# Patient Record
Sex: FEMALE | Race: WHITE
[De-identification: names, ages, dates, MRNs, and addresses within clinical notes are randomized per-mention and may not be internally consistent; named-entity substitution may affect disease eponyms.]

---

## 2020-10-03 ENCOUNTER — HOSPITAL ENCOUNTER (INPATIENT)
Dept: HOSPITAL 1 - ED | Age: 35
LOS: 9 days | Discharge: HOME | DRG: 93 | End: 2020-10-12
Attending: FAMILY MEDICINE | Admitting: FAMILY MEDICINE
Payer: SELF-PAY

## 2020-10-03 VITALS — DIASTOLIC BLOOD PRESSURE: 83 MMHG | SYSTOLIC BLOOD PRESSURE: 127 MMHG

## 2020-10-03 VITALS — HEIGHT: 66 IN | WEIGHT: 186 LBS | BODY MASS INDEX: 29.89 KG/M2

## 2020-10-03 DIAGNOSIS — G92: Primary | ICD-10-CM

## 2020-10-03 DIAGNOSIS — Z98.891: ICD-10-CM

## 2020-10-03 DIAGNOSIS — K04.7: ICD-10-CM

## 2020-10-03 DIAGNOSIS — S05.12XA: ICD-10-CM

## 2020-10-03 DIAGNOSIS — F29: ICD-10-CM
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
## 2020-10-03 DIAGNOSIS — S80.212A: ICD-10-CM

## 2020-10-03 DIAGNOSIS — Z20.828: ICD-10-CM

## 2020-10-03 DIAGNOSIS — N83.201: ICD-10-CM

## 2020-10-03 DIAGNOSIS — F15.10: ICD-10-CM

## 2020-10-03 DIAGNOSIS — Z59.0: ICD-10-CM
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
## 2020-10-03 LAB
ALBUMIN SERPL-MCNC: 4 G/DL (ref 3.4–5)
ALP SERPL-CCNC: 42 U/L (ref 46–116)
ALT SERPL-CCNC: 21 U/L (ref 14–59)
AMPHETAMINES UR QL SCN: POSITIVE
AST SERPL-CCNC: 19 U/L (ref 15–37)
BASOPHILS NFR BLD: 0.2 % (ref 0–2)
BILIRUB SERPL-MCNC: 0.8 MG/DL (ref 0.2–1)
BUN SERPL-MCNC: 23 MG/DL (ref 7–18)
CALCIUM SERPL-MCNC: 9.1 MG/DL (ref 8.5–10.1)
CHLORIDE SERPL-SCNC: 108 MMOL/L (ref 98–107)
CO2 SERPL-SCNC: 23.9 MMOL/L (ref 21–32)
CREAT SERPL-MCNC: 1 MG/DL (ref 0.6–1)
ERYTHROCYTE [DISTWIDTH] IN BLOOD BY AUTOMATED COUNT: 13.7 % (ref 11.5–14.5)
GFR SERPLBLD BASED ON 1.73 SQ M-ARVRAT: > 60 ML/MIN
GLUCOSE SERPL-MCNC: 87 MG/DL (ref 74–106)
MICROSCOPIC UR-IMP: NO
PLATELET # BLD: 254 X10^3MCL (ref 130–400)
POTASSIUM SERPL-SCNC: 3.6 MMOL/L (ref 3.5–5.1)
PROT SERPL-MCNC: 7.7 G/DL (ref 6.4–8.2)
RBC # UR STRIP.AUTO: NEGATIVE /UL
SODIUM SERPL-SCNC: 144 MMOL/L (ref 136–145)
UA SPECIFIC GRAVITY: >=1.03 (ref 1–1.03)

## 2020-10-03 PROCEDURE — G0378 HOSPITAL OBSERVATION PER HR: HCPCS

## 2020-10-03 PROCEDURE — G0480 DRUG TEST DEF 1-7 CLASSES: HCPCS

## 2020-10-03 SDOH — ECONOMIC STABILITY - HOUSING INSECURITY: HOMELESSNESS: Z59.0

## 2020-10-03 NOTE — NUR
RECEIVED REPORT FROM [facility]. FIRST ENCOUNTER WITH PT. WHO WAS SPEAKING IN
UNKNOWN LANGUAGE. BOONE SEEMS TO BE COOPERATING AT THIS TIME.
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
## 2020-10-03 NOTE — NUR
IN AND OUT CATH. DONE URINE SENT TO LAB. AND PREGNANCY TEST=NEG. COVID NASAL
SWAB ALSO DONE FOR CINDY.
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
## 2020-10-03 NOTE — NUR
AWAKE. STILL MAKES INAPPROPRIATE REMARKS, THAT DON'T MAKE SENSE. UNABLE TO 
ANSWER WHAT THE DATE IS. IV BOLUS IN  PROGRESS, SIGHT PATENT

## 2020-10-04 VITALS — SYSTOLIC BLOOD PRESSURE: 115 MMHG | DIASTOLIC BLOOD PRESSURE: 69 MMHG

## 2020-10-04 VITALS — SYSTOLIC BLOOD PRESSURE: 146 MMHG | DIASTOLIC BLOOD PRESSURE: 91 MMHG

## 2020-10-04 VITALS — SYSTOLIC BLOOD PRESSURE: 138 MMHG | DIASTOLIC BLOOD PRESSURE: 69 MMHG

## 2020-10-04 VITALS — SYSTOLIC BLOOD PRESSURE: 127 MMHG | DIASTOLIC BLOOD PRESSURE: 73 MMHG

## 2020-10-04 VITALS — SYSTOLIC BLOOD PRESSURE: 134 MMHG | DIASTOLIC BLOOD PRESSURE: 91 MMHG

## 2020-10-04 VITALS — SYSTOLIC BLOOD PRESSURE: 112 MMHG | DIASTOLIC BLOOD PRESSURE: 70 MMHG

## 2020-10-04 LAB
BASOPHILS NFR BLD: 0.5 % (ref 0–2)
BASOPHILS NFR BLD: 0.5 % (ref 0–2)
BUN SERPL-MCNC: 17 MG/DL (ref 7–18)
CALCIUM SERPL-MCNC: 8 MG/DL (ref 8.5–10.1)
CHLORIDE SERPL-SCNC: 108 MMOL/L (ref 98–107)
CHOLEST SERPL-MCNC: 242 MG/DL (ref ?–200)
CHOLEST/HDLC SERPL: 3.3 MG/DL
CO2 SERPL-SCNC: 23.9 MMOL/L (ref 21–32)
CREAT SERPL-MCNC: 0.6 MG/DL (ref 0.6–1)
ERYTHROCYTE [DISTWIDTH] IN BLOOD BY AUTOMATED COUNT: 14.3 % (ref 11.5–14.5)
ERYTHROCYTE [DISTWIDTH] IN BLOOD BY AUTOMATED COUNT: 14.4 % (ref 11.5–14.5)
GFR SERPLBLD BASED ON 1.73 SQ M-ARVRAT: > 60 ML/MIN
GLUCOSE SERPL-MCNC: 79 MG/DL (ref 74–106)
HDLC SERPL-MCNC: 73 MG/DL (ref 40–60)
MAGNESIUM SERPL-MCNC: 2 MG/DL (ref 1.8–2.4)
PHOSPHATE SERPL-MCNC: 3.6 MG/DL (ref 2.5–4.9)
PLATELET # BLD: 205 X10^3MCL (ref 130–400)
PLATELET # BLD: 210 X10^3MCL (ref 130–400)
POTASSIUM SERPL-SCNC: 3.5 MMOL/L (ref 3.5–5.1)
SODIUM SERPL-SCNC: 142 MMOL/L (ref 136–145)
TRIGL SERPL-MCNC: 58 MG/DL (ref ?–150)

## 2020-10-04 NOTE — NUR
RECEIVED PATIENT ASLEEP, EASILY AROUSABLE, A/O X3, ANSWERING QUESTIONS
APPROPRIATELY, NOT IN ACUTE DISTRESS, RESPIRATINS EVEN AND UNLABORED, DENIES
ANY PAIN AT THIS TIME, IVF CONTINOUSLY INFUSING, WILL CONTINUE TO MONITOR.

## 2020-10-04 NOTE — NUR
PT REMAINS ALERT AND ORIENTED TO SELF WITH INCOHERENT SPEECH WITH NO CHANGES
IN MENTATIONS SINCE ADMISSION TO FLOOR AT 2300. PT IS CALM, COOPERATIVE WITH
CARE BUT UNABLE TO ANSWER QUESTIONS APPROPRIATELY. NEW IV INSERTED TO RFA,
INFUSING IVF AS ORDERED WITHOUT COMPLICATIONS. VSS, SAFETY PRECAUTIONS IN
PLACE, WILL ENDORSE CARE TO ONCOMING RN.

## 2020-10-04 NOTE — NUR
The patient is alert and oriented x4. The patient did not complain of nausea
and/or has had any vomiting today. The patient states she feels better. Will
continue to monitor.

## 2020-10-04 NOTE — NUR
The patient is alert with disorientation. Words are jumbled, cannot understand
what she is saying. She is able to say yes and no, but will start talking
about another topic and starts laughing. The patient was given her medications
per MD orders. Will continue to monitor. No signs and symptoms of harm to self
and/or others.

## 2020-10-04 NOTE — NUR
The patient is alert, awake, but disoriented. The patient is able to follow
commands but not able to converse a converstion. She has flight thoughts
(flight of ideas). The patient had her ultrasound to the pelvis, due to
possible bleeding. CBC done per MD orders. Low RBC's, hemoglobin, and
hematocrit (RBC= 3.76, Hemoglobin= 11.6, and Hematocrit= 34). Will continue to
montior. No bledding noted to left knee abrasion, dry and scabs in place;
healing well. Will continue to montior.

## 2020-10-04 NOTE — NUR
The patient is alert but disoriented. The patient is on IV fluids of NS at
100cc/hr, per MD orders. Running well. New
bag hung around 5pm. The patient is currently
sleeping. She ate 100% of her dinner. No signs and symtpoms of
pain/discomfort. All medications given per MD orders. Will continue to
monitor. Call light within reach.

## 2020-10-04 NOTE — NUR
PT IS ORIENTED TO SELF, POOR HISTORIAN AND DOES NOT ANSWER QUESTIONS
APPROPRIATELY. PT IS ABLE TO MAKE NEEDS KNOWN, FOLLOWS COMMANDS AND IS CALM
AND COOPERATIVE. NOTED CONTUSION TO LEFT PERIORBITAL AREA AND SCABS TO LEFT
KNEE OPEN TO AIR. BREATHING REGULAR AND UNLABORED ON ROOM AIR, DENIES ANY
SOB, CHEST PAIN, OR DISTRESS. PT TOLERATING REGULAR DIET. IVF INFUSING INTO
RIGHT WRIST PIV WITH NO SIGNS OF INFILTRATION. SAFETY PRECAUTIONS IN PLACE,
WILL CONTINUE TO MONITOR

## 2020-10-05 VITALS — SYSTOLIC BLOOD PRESSURE: 104 MMHG | DIASTOLIC BLOOD PRESSURE: 60 MMHG

## 2020-10-05 VITALS — DIASTOLIC BLOOD PRESSURE: 63 MMHG | SYSTOLIC BLOOD PRESSURE: 119 MMHG

## 2020-10-05 VITALS — DIASTOLIC BLOOD PRESSURE: 72 MMHG | SYSTOLIC BLOOD PRESSURE: 115 MMHG

## 2020-10-05 VITALS — SYSTOLIC BLOOD PRESSURE: 128 MMHG | DIASTOLIC BLOOD PRESSURE: 75 MMHG

## 2020-10-05 VITALS — SYSTOLIC BLOOD PRESSURE: 111 MMHG | DIASTOLIC BLOOD PRESSURE: 70 MMHG

## 2020-10-05 LAB
BASOPHILS NFR BLD: 0.4 % (ref 0–2)
BUN SERPL-MCNC: 11 MG/DL (ref 7–18)
CALCIUM SERPL-MCNC: 7.7 MG/DL (ref 8.5–10.1)
CHLORIDE SERPL-SCNC: 109 MMOL/L (ref 98–107)
CO2 SERPL-SCNC: 25.9 MMOL/L (ref 21–32)
CREAT SERPL-MCNC: 0.6 MG/DL (ref 0.6–1)
ERYTHROCYTE [DISTWIDTH] IN BLOOD BY AUTOMATED COUNT: 14.4 % (ref 11.5–14.5)
GFR SERPLBLD BASED ON 1.73 SQ M-ARVRAT: > 60 ML/MIN
GLUCOSE SERPL-MCNC: 72 MG/DL (ref 74–106)
MAGNESIUM SERPL-MCNC: 1.8 MG/DL (ref 1.8–2.4)
PHOSPHATE SERPL-MCNC: 3.1 MG/DL (ref 2.5–4.9)
PLATELET # BLD: 194 X10^3MCL (ref 130–400)
POTASSIUM SERPL-SCNC: 3.8 MMOL/L (ref 3.5–5.1)
SODIUM SERPL-SCNC: 141 MMOL/L (ref 136–145)

## 2020-10-05 NOTE — NUR
Discount pharmacy card and list to low cost medical clinics given to patient. Baclofen pump 5/2011 and Woo rods to spine 8/2011
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
## 2020-10-05 NOTE — NUR
RECEIVED PATIENT AWAKE IN BED, NOT IN ACUTE DISTRESS, RESPIRATIONS EVEN AND
UNLABORED, DENIES ANY PAIN AT THIS TIME, SEEN BY RESIDENT OB, NO NEW ORDERS AT
THIS TIME, WILL CONTINUE TO MONITOR PATIENT

## 2020-10-06 VITALS — DIASTOLIC BLOOD PRESSURE: 63 MMHG | SYSTOLIC BLOOD PRESSURE: 116 MMHG

## 2020-10-06 VITALS — DIASTOLIC BLOOD PRESSURE: 85 MMHG | SYSTOLIC BLOOD PRESSURE: 140 MMHG

## 2020-10-06 VITALS — SYSTOLIC BLOOD PRESSURE: 134 MMHG | DIASTOLIC BLOOD PRESSURE: 77 MMHG

## 2020-10-06 VITALS — SYSTOLIC BLOOD PRESSURE: 117 MMHG | DIASTOLIC BLOOD PRESSURE: 70 MMHG

## 2020-10-06 VITALS — SYSTOLIC BLOOD PRESSURE: 127 MMHG | DIASTOLIC BLOOD PRESSURE: 78 MMHG

## 2020-10-06 NOTE — NUR
Awake and verbally responsive. Oriented x4. No respiratory distress noted on
room air.  Denies pain. Denies n/v. Will cont.to monitor. Safety maintained at
all times. 5150 hold for gravely disabled.

## 2020-10-06 NOTE — NUR
PATIENT SLEEPING COMFORTABLY, IVF CONTINOUSLY INFUSING, NO DISTRESS NOTED,
WILL CONTINUE TO MONITOR.
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
## 2020-10-06 NOTE — NUR
Awake and sitting on the bed. All due meds explained indication and possible
side effects. Refused to take risperdal at this time stating" I am not
mental". I don't take mental medication".

## 2020-10-07 VITALS — SYSTOLIC BLOOD PRESSURE: 134 MMHG | DIASTOLIC BLOOD PRESSURE: 82 MMHG

## 2020-10-07 VITALS — SYSTOLIC BLOOD PRESSURE: 91 MMHG | DIASTOLIC BLOOD PRESSURE: 54 MMHG

## 2020-10-07 VITALS — DIASTOLIC BLOOD PRESSURE: 61 MMHG | SYSTOLIC BLOOD PRESSURE: 101 MMHG

## 2020-10-07 VITALS — DIASTOLIC BLOOD PRESSURE: 88 MMHG | SYSTOLIC BLOOD PRESSURE: 126 MMHG

## 2020-10-07 VITALS — DIASTOLIC BLOOD PRESSURE: 77 MMHG | SYSTOLIC BLOOD PRESSURE: 124 MMHG

## 2020-10-07 NOTE — NUR
Afebrile. No significant change in condition noted. Resting comfortably in
bed. No neurological changes. Safety maintained. In no apparent distress.

## 2020-10-07 NOTE — NUR
Call Center is aware of patient and will assist with bed placement when
medically cleared. Patient COVID antigen is negative but out sourced psych
facilities request for COVID PCR. Patient is self funded with
presumptive Medical pending. Call Center night shift will monitor RN/MD notes
during shift for medical clearance.

## 2020-10-08 VITALS — SYSTOLIC BLOOD PRESSURE: 112 MMHG | DIASTOLIC BLOOD PRESSURE: 65 MMHG

## 2020-10-08 VITALS — DIASTOLIC BLOOD PRESSURE: 81 MMHG | SYSTOLIC BLOOD PRESSURE: 126 MMHG

## 2020-10-08 VITALS — DIASTOLIC BLOOD PRESSURE: 65 MMHG | SYSTOLIC BLOOD PRESSURE: 107 MMHG

## 2020-10-08 VITALS — SYSTOLIC BLOOD PRESSURE: 114 MMHG | DIASTOLIC BLOOD PRESSURE: 64 MMHG

## 2020-10-08 NOTE — NUR
DR. FARR WAS IN ROOM AT CHANGE OF SHIFT SPEAKING WITH PT. NOTED ON PROGRESS
NOTES HE WROTE TO D/C 5150 HOLD. PT AWAKE AND ALERT, WATCHING TV. ANSWERING
QUESTIONS APPROPRIATELY. BREATHING EVEN AND UNLABORED ON ROOM AIR. IVF
INFUSING WELL.
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
## 2020-10-08 NOTE — NUR
MAE FROM [facility] CALLED. READ TO HER DR. FARR NOTES
INCLUDING D/C 1708 HOLD AND  CONSULT FOR [facility],
DOMEST[facility] RESOURCES AND DAY REHAB RESOURCES. SHE STATED PT WILL NO
LONGER NEED IN PATIENT PSYCH FACILITY.

## 2020-10-08 NOTE — NUR
eyes closed, breathing even and unlabored on room air. call light within easy
reach. ivf infusing well.

## 2020-10-08 NOTE — NUR
EYES CLOSED, BREATHING EVEN AND UNLABORED ON ROOM AIR. IVF INFUSING WELL. CALL
LIGHT WITHIN EASY REACH.

## 2020-10-08 NOTE — NUR
SLEPT THROUGH MOST OF SHIFT. AMBULATED TO RESTROOM WITH STEADY GAIT. IVF
INFUSING WELL. IV SITE FREE FROM REDNESS OR SWELLING. BREATHING REMAINED EVEN
AND UNLABORED ON ROOM AIR.

## 2020-10-09 VITALS — DIASTOLIC BLOOD PRESSURE: 69 MMHG | SYSTOLIC BLOOD PRESSURE: 125 MMHG

## 2020-10-09 VITALS — SYSTOLIC BLOOD PRESSURE: 119 MMHG | DIASTOLIC BLOOD PRESSURE: 72 MMHG

## 2020-10-09 VITALS — SYSTOLIC BLOOD PRESSURE: 123 MMHG | DIASTOLIC BLOOD PRESSURE: 73 MMHG

## 2020-10-09 VITALS — DIASTOLIC BLOOD PRESSURE: 79 MMHG | SYSTOLIC BLOOD PRESSURE: 104 MMHG

## 2020-10-09 VITALS — SYSTOLIC BLOOD PRESSURE: 130 MMHG | DIASTOLIC BLOOD PRESSURE: 87 MMHG

## 2020-10-09 NOTE — NUR
AWAKE AND ALERT, SLEPT THROUGH MOST OF SHIFT. BREATHING EVEN AND UNLABORED ON
ROOM AIR. CALL LIGHT WITHIN EASY REACH

## 2020-10-09 NOTE — NUR
PT ACCIDENTALLY PULLED OUT IV. TIP INTACT. DID NOT WANT ANY IV ANYMORE.PT HAS
BEEN EATING AND DRINKING WELL, ON REGULAR DIET.

## 2020-10-09 NOTE — NUR
PATIENT REMAINS CALM AND COOEPRATIVE AT THIS TIME. PATIENT DENIES ANY PAIN OR
DISCOMFORT. WILL CONTINUE TO MONITOR PATIENT.

## 2020-10-09 NOTE — NUR
RECEIVED PT FROM AM NURSE, PT AWAKE AND ALERT AND MAKE NEEDS KNOWN. DENIES
HEADACHE/ DENIES DIZZINESS. MED SURG PT. DENIES CHEST PAIN/ CHEST PRESSURE.
PULSES PALPABLE, NO EDEMA. LUNG SOUNDS CTA, RESPIRATIONS E/U ON ROOM AIR.
ACTIVE BS X 4 QUADS, DENIES N/V/D. VOID FREELY. PT IS AMBULATORY. LEFT KNEE
ABRASION RENE. DENIES PAIN AT THIS TIME. NO IV ACCESS AT THIS TIME, MD AWARE.
NO CONCERNS AT THIS TIME. ALL NEEDS MET. CALL BUTTON WITHIN REACH. WILL
CONTINUE TO MONITOR.

## 2020-10-10 VITALS — DIASTOLIC BLOOD PRESSURE: 53 MMHG | SYSTOLIC BLOOD PRESSURE: 106 MMHG

## 2020-10-10 VITALS — SYSTOLIC BLOOD PRESSURE: 121 MMHG | DIASTOLIC BLOOD PRESSURE: 68 MMHG

## 2020-10-10 VITALS — DIASTOLIC BLOOD PRESSURE: 51 MMHG | SYSTOLIC BLOOD PRESSURE: 95 MMHG

## 2020-10-10 VITALS — SYSTOLIC BLOOD PRESSURE: 114 MMHG | DIASTOLIC BLOOD PRESSURE: 62 MMHG

## 2020-10-10 VITALS — SYSTOLIC BLOOD PRESSURE: 138 MMHG | DIASTOLIC BLOOD PRESSURE: 80 MMHG

## 2020-10-10 NOTE — NUR
PATIENT IS A&OX4, FOLLOWS COMMANDS AND COOPERATES WELL. MEDSURG PATIENT,
DENIES CHEST PAIN. PERIPHERAL PULSES PALPABLE W/ NO SIGNS OF EDEMA. LUNG
SOUNDS CTA BILATERALLY, ON RA, O2 SAT 98%, DENIES SOB. NORMOACTIVE BSX4, ABD
SOFT AND FLAT, DENIES ABD PAIN. VOIDS USING RESTROOM, AMBULATORY. L KNEE
ABRASION OBSERVED, RENE, NO DRAINAGE. DENEIS PAIN OR DISCOMFORT AT THIS TIME.
NO IV ACCESS. ALL QUESTIONS AND CONCERNS HAVE BEEN ADDRESSED, WILL CONTINUE TO
MONITOR.

## 2020-10-10 NOTE — NUR
RECEIVED PT FROM AM NURSE, PT AWAKE SITTING IN BED WATCHING TELEVISION. AA/O X
4, ABLE TO MAKE NEEDS KNOWN, CLEAR SPEECH, DENIES HEADACHE/ DENIES DIZZINESS.
MED SURG PT, DENIES CHEST PAIN/ DENIES CHEST PRESSURE. PULSE PALPABLE, NO
EDEMA. LUNG SOUNDS CTA, RESPIRATIONS E/U ON RA, SPO2 98%. ACTIVE BS X 4 QUADS,
ABD SOFT AND NON TENDER/ NON DISTENDED. VOIDS FREELY. LEFT KNEE ABRASION, RENE.
DENIES PAIN. NO IV ACCESS, MD AWARE. PT IS CALM AND COMPLIANT. NO ACUTE
DISTRESS. ALL NEEDS MET. CALL BUTTON WITHIN REACH, WILL CONTINUE TO MONITOR.

## 2020-10-10 NOTE — NUR
NUTRITIONIST RECOMMENDED THAT PATIENT BE SET TO A CARDIAC DIET SINCE PATIENT'S
CHOLESTEROL , AND LDL . WILL PAGE MD TO CONSIDER SWITCHING PATIENT
TO CARDIAC DIET. WILL CONTINUE TO MONITOR PATIENT.

## 2020-10-10 NOTE — NUR
PATIENT IS CURRENTLY RESTING IN BED AT THIS TIME. PATIENT CHANGED TO HER OWN
PERSONAL CLOTHES BUT REMAINS IN BED. WILL CONTINUE TO MONITOR PATIENT. NO
ACUTE EVENTS NOTED.
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
## 2020-10-10 NOTE — NUR
PATIENT WAS FOUND PICKING ON SCAB OF LEFT KNEE. SITE OF SCAB WAS BLEEDING.
OFFERED AND APPLIED GAUZE DRESSING ANDS ECURED WITH PAPER TAPE. INSTRUCTED
PATIENT TO NOT REMOVE SCAB. OTHERWISE, PATIENT DENIES ANY PAIN OR DISCOMFORT
AT THIS TIME.

## 2020-10-10 NOTE — NUR
1) d/c regular diet. Start cardiac diet to elevated total cholesterol and LDL
levels.
   Recommendation given to pt's primary nurse, pt's nurse will page MD

## 2020-10-10 NOTE — NUR
PT IN BED RESTING WITH EYES CLOSED, BUT EASILY AROUSABLE. RESPIRATIONS E/U ON
ROOM AIR. NO ACUTE DISTRESS AT THIS TIME. CALL BUTTON WITHIN REACH, WILL
CONTINUE TO MONITOR.

## 2020-10-10 NOTE — NUR
PT SLEPT IN INTERVALS THROUGHOUT THE NIGHT, BUT EASILY AROUSABLE. RESPIRATIONS
E/U ON ROOM AIR. DENIES PAIN. NO ACUTE CHANGES OVERNIGHT. ALL NEEDS MET. CALL
BUTTON WITHIN REACH, WILL CONTINUE TO MONITOR.

## 2020-10-11 VITALS — SYSTOLIC BLOOD PRESSURE: 111 MMHG | DIASTOLIC BLOOD PRESSURE: 69 MMHG

## 2020-10-11 VITALS — SYSTOLIC BLOOD PRESSURE: 124 MMHG | DIASTOLIC BLOOD PRESSURE: 74 MMHG

## 2020-10-11 VITALS — SYSTOLIC BLOOD PRESSURE: 146 MMHG | DIASTOLIC BLOOD PRESSURE: 86 MMHG

## 2020-10-11 VITALS — DIASTOLIC BLOOD PRESSURE: 58 MMHG | SYSTOLIC BLOOD PRESSURE: 106 MMHG

## 2020-10-11 VITALS — DIASTOLIC BLOOD PRESSURE: 70 MMHG | SYSTOLIC BLOOD PRESSURE: 119 MMHG

## 2020-10-11 NOTE — NUR
PT REFUSED AM LABS X 3, RISKS AND BENEFITS EXPLAINED TO PT. PT CONTINUES TO
REFUSE. WILL CONTINUE TO MONITOR.

## 2020-10-11 NOTE — NUR
PT SLEPT IN INTERVALS THROUGHOUT THE NIGHT, BUT EASILY AROUSABLE. RESPIRATIONS
E/U ON RA. PT IS MED SURG. NO ACUTE CHANGES OVERNIGHT. ALL NEEDS MET, PT
DENIES ANY PAIN. CALL BUTTON WITHIN REACH, WILL ENDORSE CARE TO AM NURSE.

## 2020-10-11 NOTE — NUR
PT AWAKE AND IN BED WATCHING TELEVISION. PT STATED SHE IS FINE AND DOES NOT
NEED ANYTHING AT THIS TIME. RESPIRATIONS E/U ON ROOM AIR. DENIES PAIN AT THIS
TIME. NO IV ACCESS AT THIS TIME, MD AWARE. ALL NEEDS MET. NO CONCERNS AT THIS
TIME. NO ACUTE DISTRESS AT THIS TIME. PT IS CALM AND COMPLIANT. CALL BUTTON
WITHIN REACH, WILL CONTINUE TO MONITOR.

## 2020-10-11 NOTE — NUR
SPOKE TO GUANAKITO FROM [facility]. GUANAKITO STATED WE NEED NEW
5150 HOLD IN ORDER TO HAVE PATIENT READY FOR PSYCH FACILITY PLACEMENT. WILL
COMMUNICATE WITH DAYSHIFT NURSE TO COMMUNICATE WITH DR. FARR DURING ROUNDS.

## 2020-10-11 NOTE — NUR
RECEIVED PT FROM DAYSHIFT NURSE. PT IS AAOX4, DENIES
HEADACHE/NAUSEA/DIZZINESS. PT IS MED SURG PATIENT, DENIES CHEST PAIN. PULSES
ARE EQUAL BILATERALLY, NO EDEMA NOTED. CTA, ON RA, DENIES SOB, NO ACUTE
DISTRESS NOTED. ABDOMEN IS SOFT AND ROUND, NO PAIN UPON PALPATION. NORMOACTIVE
X4 QUADRANTS, LAST BM 10/08. PT VOIDS FREELY, DENIES PAIN UPON URINATION. PT
DENIES WEAKNESS AT THIS TIME. PT HAS SKIN ABRASION TO LEFT KNEE, DRESSING IN
PLACE, CDI. PT HAS NO IV SITE AT THIS TIME. PT HAS PREVIOUS 5150 HOLD, GUARDED
AT TIMES. ALL SAFETY MEASURES IN PLACE. BED IN LOWEST POSITION, CALL LIGHT
WITHIN REACH. WILL CONTINUE TO MONITOR.

## 2020-10-12 VITALS — DIASTOLIC BLOOD PRESSURE: 76 MMHG | SYSTOLIC BLOOD PRESSURE: 116 MMHG

## 2020-10-12 VITALS — SYSTOLIC BLOOD PRESSURE: 123 MMHG | DIASTOLIC BLOOD PRESSURE: 64 MMHG

## 2020-10-12 VITALS — SYSTOLIC BLOOD PRESSURE: 132 MMHG | DIASTOLIC BLOOD PRESSURE: 83 MMHG

## 2020-10-12 NOTE — NUR
PATIENT A&OX4, AMBULATED IN THE ROOM AND TO THE BATHROOM. PATIENT DENIES ANY
SUICITAL THOUGHT OR THOUGHTS OF HURTING HERSEL. ASKED PATIENT TO VOICE OUT ANY
CONCERNS OR QUESTIONS SHE HAS. SPOKE WITH  JULIAN, PATIENT IS
GOING TO [facility]. [facility]I ARRANGED TO PICK HER UP IN THE AFTERNOON.

## 2020-10-12 NOTE — NUR
DISCHARGE PAPER AND INSTRUCTIONS GIVEN TO PATIENT AND VERBALIZED
UNDERSTANDING. GOT A PACKAGE FROM JULIAN, INFORMATION ABOUT
[facility] LOCATIONS AND FINDING FOOD RESOURCES. PATIENT VERBALIZED
UNDERSTANDING. PATIENT DOES NOT HAVE ANY IV LINE. SHE IS ALL PACKED WAITING
FOR TAXI TO COME.

## 2020-10-12 NOTE — NUR
PATIENT RESTED INTERMITTENTLY WITH EYES CLOSED. NO ACUTE CHANGES IN PATIENT'S
STATUS AT THIS TIME. BED IN LOWEST POSITION, CALL LIGHT WITHIN REACH. WILL
CONTINUE TO MONITOR.

## 2020-10-12 NOTE — NUR
DRESSING ON LEFT KNEE ABRASION CHANGED. WOUND PICTURE WAS TAKEN FOR ABRASION.
PATIENT DENIES PAIN AT THIS TIME. ALL SAFETY MEASURES IN PLACE. BED IN LOWEST
POSITION, CALL LIGHT WITHIN REACH. WILL CONTINUE TO MONITOR.

## 2020-10-12 NOTE — NUR
PATIENT RESTING WITH EYES CLOSED INTERMITTENTLY. PATIENT REFUSED TO TAKE
RESPIRDOL. COMMUNICATED WITH DR. BARILLAS REGARDING REFUSAL FOR MEDICATION AND
CALL FROM [facility], [place]. ALL SAFETY MEASURES IN PLACE.
BED IN LOWEST POSITION, CALL LIGHT WITHIN REACH. WILL CONTINUE TO MONITOR.

## 2020-12-02 ENCOUNTER — HOSPITAL ENCOUNTER (EMERGENCY)
Dept: HOSPITAL 1 - ED | Age: 35
End: 2020-12-02
Payer: SELF-PAY

## 2020-12-02 VITALS — BODY MASS INDEX: 31.16 KG/M2 | WEIGHT: 187 LBS | HEIGHT: 65 IN

## 2020-12-02 DIAGNOSIS — Z20.828: ICD-10-CM

## 2020-12-02 DIAGNOSIS — N76.0: ICD-10-CM

## 2020-12-02 DIAGNOSIS — R45.851: Primary | ICD-10-CM

## 2020-12-02 LAB
ALBUMIN SERPL-MCNC: 4.1 G/DL (ref 3.4–5)
ALP SERPL-CCNC: 48 U/L (ref 46–116)
ALT SERPL-CCNC: 14 U/L (ref 14–59)
AMPHETAMINES UR QL SCN: (no result)
AST SERPL-CCNC: 17 U/L (ref 15–37)
BASOPHILS NFR BLD: 0.1 % (ref 0–2)
BILIRUB SERPL-MCNC: 0.6 MG/DL (ref 0.2–1)
BUN SERPL-MCNC: 18 MG/DL (ref 7–18)
CALCIUM SERPL-MCNC: 8.9 MG/DL (ref 8.5–10.1)
CHLORIDE SERPL-SCNC: 104 MMOL/L (ref 98–107)
CO2 SERPL-SCNC: 27.7 MMOL/L (ref 21–32)
CREAT SERPL-MCNC: 0.8 MG/DL (ref 0.6–1)
ERYTHROCYTE [DISTWIDTH] IN BLOOD BY AUTOMATED COUNT: 13.7 % (ref 11.5–14.5)
GFR SERPLBLD BASED ON 1.73 SQ M-ARVRAT: > 60 ML/MIN
GLUCOSE SERPL-MCNC: 83 MG/DL (ref 74–106)
PLATELET # BLD: 228 X10^3MCL (ref 130–400)
POTASSIUM SERPL-SCNC: 3.7 MMOL/L (ref 3.5–5.1)
PROT SERPL-MCNC: 7.5 G/DL (ref 6.4–8.2)
SODIUM SERPL-SCNC: 136 MMOL/L (ref 136–145)

## 2020-12-02 PROCEDURE — G0480 DRUG TEST DEF 1-7 CLASSES: HCPCS

## 2020-12-02 PROCEDURE — U0003 INFECTIOUS AGENT DETECTION BY NUCLEIC ACID (DNA OR RNA); SEVERE ACUTE RESPIRATORY SYNDROME CORONAVIRUS 2 (SARS-COV-2) (CORONAVIRUS DISEASE [COVID-19]), AMPLIFIED PROBE TECHNIQUE, MAKING USE OF HIGH THROUGHPUT TECHNOLOGIES AS DESCRIBED BY CMS-2020-01-R: HCPCS

## 2020-12-04 VITALS — SYSTOLIC BLOOD PRESSURE: 118 MMHG | DIASTOLIC BLOOD PRESSURE: 68 MMHG

## 2020-12-16 ENCOUNTER — HOSPITAL ENCOUNTER (EMERGENCY)
Dept: HOSPITAL 1 - ED | Age: 35
LOS: 1 days | Discharge: LEFT BEFORE DISCHARGE | End: 2020-12-17
Payer: SELF-PAY

## 2020-12-16 VITALS — DIASTOLIC BLOOD PRESSURE: 95 MMHG | SYSTOLIC BLOOD PRESSURE: 154 MMHG

## 2020-12-16 VITALS
HEIGHT: 65 IN | BODY MASS INDEX: 31.7 KG/M2 | HEIGHT: 65 IN | BODY MASS INDEX: 31.7 KG/M2 | WEIGHT: 190.25 LBS | WEIGHT: 190.25 LBS

## 2020-12-16 DIAGNOSIS — Z53.21: Primary | ICD-10-CM
